# Patient Record
Sex: FEMALE | Race: WHITE | NOT HISPANIC OR LATINO | Employment: FULL TIME | URBAN - METROPOLITAN AREA
[De-identification: names, ages, dates, MRNs, and addresses within clinical notes are randomized per-mention and may not be internally consistent; named-entity substitution may affect disease eponyms.]

---

## 2017-10-12 ENCOUNTER — HOSPITAL ENCOUNTER (OUTPATIENT)
Dept: FAMILY MEDICINE CLINIC | Facility: CLINIC | Age: 37
Setting detail: SPECIMEN
Discharge: HOME | End: 2017-10-12

## 2017-10-12 LAB
ALBUMIN SERPL-MCNC: 3.9 G/DL (ref 3.5–4.8)
ALBUMIN/GLOB SERPL: 1.3 {RATIO} (ref 1–1.7)
ALP SERPL-CCNC: 53 IU/L (ref 32–91)
ALT SERPL-CCNC: 48 IU/L (ref 14–54)
ANION GAP SERPL CALC-SCNC: 11.1 MMOL/L (ref 10–20)
AST SERPL-CCNC: 34 IU/L (ref 15–41)
BASOPHILS # BLD AUTO: 0.1 10*3/UL (ref 0–0.2)
BASOPHILS NFR BLD AUTO: 2 % (ref 0–2)
BILIRUB SERPL-MCNC: 1 MG/DL (ref 0.3–1.2)
BUN SERPL-MCNC: 13 MG/DL (ref 8–20)
BUN/CREAT SERPL: 18.6 (ref 5.4–26.2)
CALCIUM SERPL-MCNC: 9 MG/DL (ref 8.9–10.3)
CHLORIDE SERPL-SCNC: 104 MMOL/L (ref 101–111)
CHOLEST SERPL-MCNC: 163 MG/DL
CHOLEST/HDLC SERPL: 3.5 {RATIO}
CONV CO2: 26 MMOL/L (ref 22–32)
CONV LDL CHOLESTEROL DIRECT: 102 MG/DL (ref 0–100)
CONV TOTAL PROTEIN: 6.8 G/DL (ref 6.1–7.9)
CREAT UR-MCNC: 0.7 MG/DL (ref 0.4–1)
DIFFERENTIAL METHOD BLD: (no result)
EOSINOPHIL # BLD AUTO: 0.5 10*3/UL (ref 0–0.3)
EOSINOPHIL # BLD AUTO: 8 % (ref 0–3)
ERYTHROCYTE [DISTWIDTH] IN BLOOD BY AUTOMATED COUNT: 12.8 % (ref 11.5–14.5)
GLOBULIN UR ELPH-MCNC: 2.9 G/DL (ref 2.5–3.8)
GLUCOSE SERPL-MCNC: 94 MG/DL (ref 65–99)
HCT VFR BLD AUTO: 38.1 % (ref 35–49)
HDLC SERPL-MCNC: 47 MG/DL
HGB BLD-MCNC: 13 G/DL (ref 12–15)
LDLC/HDLC SERPL: 2.2 {RATIO}
LIPID INTERPRETATION: ABNORMAL
LYMPHOCYTES # BLD AUTO: 1.4 10*3/UL (ref 0.8–4.8)
LYMPHOCYTES NFR BLD AUTO: 21 % (ref 18–42)
MCH RBC QN AUTO: 30.4 PG (ref 26–32)
MCHC RBC AUTO-ENTMCNC: 34.1 G/DL (ref 32–36)
MCV RBC AUTO: 89.2 FL (ref 80–94)
MONOCYTES # BLD AUTO: 0.4 10*3/UL (ref 0.1–1.3)
MONOCYTES NFR BLD AUTO: 6 % (ref 2–11)
NEUTROPHILS # BLD AUTO: 4.1 10*3/UL (ref 2.3–8.6)
NEUTROPHILS NFR BLD AUTO: 63 % (ref 50–75)
NRBC BLD AUTO-RTO: 0 /100{WBCS}
NRBC/RBC NFR BLD MANUAL: 0 10*3/UL
PLATELET # BLD AUTO: 298 10*3/UL (ref 150–450)
PMV BLD AUTO: 9.3 FL (ref 7.4–10.4)
POTASSIUM SERPL-SCNC: 4.1 MMOL/L (ref 3.6–5.1)
RBC # BLD AUTO: 4.28 10*6/UL (ref 4–5.4)
SODIUM SERPL-SCNC: 137 MMOL/L (ref 136–144)
TRIGL SERPL-MCNC: 68 MG/DL
TSH SERPL-ACNC: 1.89 UIU/ML (ref 0.34–5.6)
VLDLC SERPL CALC-MCNC: 13.8 MG/DL
WBC # BLD AUTO: 6.5 10*3/UL (ref 4.5–11.5)

## 2020-01-24 ENCOUNTER — APPOINTMENT (OUTPATIENT)
Dept: GENERAL RADIOLOGY | Facility: HOSPITAL | Age: 40
End: 2020-01-24

## 2020-01-24 ENCOUNTER — HOSPITAL ENCOUNTER (EMERGENCY)
Facility: HOSPITAL | Age: 40
Discharge: HOME | End: 2020-01-24
Attending: EMERGENCY MEDICINE | Admitting: EMERGENCY MEDICINE

## 2020-01-24 VITALS
BODY MASS INDEX: 29.38 KG/M2 | TEMPERATURE: 97.8 F | RESPIRATION RATE: 18 BRPM | WEIGHT: 176.37 LBS | HEIGHT: 65 IN | SYSTOLIC BLOOD PRESSURE: 114 MMHG | DIASTOLIC BLOOD PRESSURE: 78 MMHG | HEART RATE: 73 BPM | OXYGEN SATURATION: 100 %

## 2020-01-24 DIAGNOSIS — K21.9 GASTROESOPHAGEAL REFLUX DISEASE, ESOPHAGITIS PRESENCE NOT SPECIFIED: ICD-10-CM

## 2020-01-24 DIAGNOSIS — R07.9 CHEST PAIN, UNSPECIFIED TYPE: Primary | ICD-10-CM

## 2020-01-24 LAB
ANION GAP SERPL CALCULATED.3IONS-SCNC: 12 MMOL/L (ref 5–15)
BASOPHILS # BLD AUTO: 0.1 10*3/MM3 (ref 0–0.2)
BASOPHILS NFR BLD AUTO: 1.1 % (ref 0–1.5)
BUN BLD-MCNC: 17 MG/DL (ref 6–20)
BUN/CREAT SERPL: 26.6 (ref 7–25)
CALCIUM SPEC-SCNC: 9 MG/DL (ref 8.6–10.5)
CHLORIDE SERPL-SCNC: 104 MMOL/L (ref 98–107)
CO2 SERPL-SCNC: 24 MMOL/L (ref 22–29)
CREAT BLD-MCNC: 0.64 MG/DL (ref 0.57–1)
DEPRECATED RDW RBC AUTO: 41.6 FL (ref 37–54)
EOSINOPHIL # BLD AUTO: 0.4 10*3/MM3 (ref 0–0.4)
EOSINOPHIL NFR BLD AUTO: 5.4 % (ref 0.3–6.2)
ERYTHROCYTE [DISTWIDTH] IN BLOOD BY AUTOMATED COUNT: 13.4 % (ref 12.3–15.4)
GFR SERPL CREATININE-BSD FRML MDRD: 104 ML/MIN/1.73
GLUCOSE BLD-MCNC: 101 MG/DL (ref 65–99)
HCT VFR BLD AUTO: 39.7 % (ref 34–46.6)
HGB BLD-MCNC: 13.5 G/DL (ref 12–15.9)
LYMPHOCYTES # BLD AUTO: 1.2 10*3/MM3 (ref 0.7–3.1)
LYMPHOCYTES NFR BLD AUTO: 18.5 % (ref 19.6–45.3)
MCH RBC QN AUTO: 30.5 PG (ref 26.6–33)
MCHC RBC AUTO-ENTMCNC: 34.1 G/DL (ref 31.5–35.7)
MCV RBC AUTO: 89.4 FL (ref 79–97)
MONOCYTES # BLD AUTO: 0.4 10*3/MM3 (ref 0.1–0.9)
MONOCYTES NFR BLD AUTO: 6.1 % (ref 5–12)
NEUTROPHILS # BLD AUTO: 4.6 10*3/MM3 (ref 1.7–7)
NEUTROPHILS NFR BLD AUTO: 68.9 % (ref 42.7–76)
NRBC BLD AUTO-RTO: 0 /100 WBC (ref 0–0.2)
PLATELET # BLD AUTO: 312 10*3/MM3 (ref 140–450)
PMV BLD AUTO: 9.3 FL (ref 6–12)
POTASSIUM BLD-SCNC: 4.2 MMOL/L (ref 3.5–5.2)
RBC # BLD AUTO: 4.44 10*6/MM3 (ref 3.77–5.28)
SODIUM BLD-SCNC: 140 MMOL/L (ref 136–145)
TROPONIN T SERPL-MCNC: <0.01 NG/ML (ref 0–0.03)
WBC NRBC COR # BLD: 6.6 10*3/MM3 (ref 3.4–10.8)

## 2020-01-24 PROCEDURE — 96374 THER/PROPH/DIAG INJ IV PUSH: CPT

## 2020-01-24 PROCEDURE — 85025 COMPLETE CBC W/AUTO DIFF WBC: CPT | Performed by: EMERGENCY MEDICINE

## 2020-01-24 PROCEDURE — 84484 ASSAY OF TROPONIN QUANT: CPT | Performed by: EMERGENCY MEDICINE

## 2020-01-24 PROCEDURE — 71045 X-RAY EXAM CHEST 1 VIEW: CPT

## 2020-01-24 PROCEDURE — 96375 TX/PRO/DX INJ NEW DRUG ADDON: CPT

## 2020-01-24 PROCEDURE — 25010000002 ONDANSETRON PER 1 MG: Performed by: EMERGENCY MEDICINE

## 2020-01-24 PROCEDURE — 93005 ELECTROCARDIOGRAM TRACING: CPT | Performed by: EMERGENCY MEDICINE

## 2020-01-24 PROCEDURE — 99283 EMERGENCY DEPT VISIT LOW MDM: CPT

## 2020-01-24 PROCEDURE — 80048 BASIC METABOLIC PNL TOTAL CA: CPT | Performed by: EMERGENCY MEDICINE

## 2020-01-24 RX ORDER — SODIUM CHLORIDE 0.9 % (FLUSH) 0.9 %
10 SYRINGE (ML) INJECTION AS NEEDED
Status: DISCONTINUED | OUTPATIENT
Start: 2020-01-24 | End: 2020-01-24 | Stop reason: HOSPADM

## 2020-01-24 RX ORDER — PANTOPRAZOLE SODIUM 40 MG/1
40 TABLET, DELAYED RELEASE ORAL DAILY
Qty: 14 TABLET | Refills: 0 | Status: SHIPPED | OUTPATIENT
Start: 2020-01-24

## 2020-01-24 RX ORDER — ONDANSETRON 2 MG/ML
4 INJECTION INTRAMUSCULAR; INTRAVENOUS ONCE
Status: COMPLETED | OUTPATIENT
Start: 2020-01-24 | End: 2020-01-24

## 2020-01-24 RX ADMIN — ONDANSETRON 4 MG: 2 INJECTION INTRAMUSCULAR; INTRAVENOUS at 17:41

## 2020-01-24 RX ADMIN — FAMOTIDINE 20 MG: 10 INJECTION INTRAVENOUS at 17:41

## 2020-01-24 NOTE — ED PROVIDER NOTES
Subjective   Patient is a 39-year-old female who had a 20-minute episode of pressure in her chest and epigastric region.  She states she did vomit one time.  She feels at baseline now without complaint.  The pressure was moderate to severe while it had occurred.  She denied associated shortness of breath cough or fever.          Review of Systems  Negative for headache earache throat cough fever vomit diarrhea dysuria denies weight loss or other associated complaints.  Completely systems was obtained and is otherwise negative.  No past medical history on file.    No Known Allergies    No past surgical history on file.    No family history on file.    Social History     Socioeconomic History   • Marital status:      Spouse name: Not on file   • Number of children: Not on file   • Years of education: Not on file   • Highest education level: Not on file           Objective   Physical Exam  HEENT exam shows TMs to be clear.  Oropharynx comers but sclerae nonicteric.  Neck has no adenopathy JVD or bruits.  Lungs are clear.  Heart has a regular rate rhythm without murmur rub or gallop.  Chest is nontender.  Abdomen is soft nontender.  Patient has normal bowel sounds without renogram.  Back has no CVA tenderness.  Extremity exam is no cyanosis or edema.  Procedures     My EKG interpretation shows normal sinus rhythm with no acute ST change      ED Course      Results for orders placed or performed during the hospital encounter of 01/24/20   Basic Metabolic Panel   Result Value Ref Range    Glucose 101 (H) 65 - 99 mg/dL    BUN 17 6 - 20 mg/dL    Creatinine 0.64 0.57 - 1.00 mg/dL    Sodium 140 136 - 145 mmol/L    Potassium 4.2 3.5 - 5.2 mmol/L    Chloride 104 98 - 107 mmol/L    CO2 24.0 22.0 - 29.0 mmol/L    Calcium 9.0 8.6 - 10.5 mg/dL    eGFR Non African Amer 104 >60 mL/min/1.73    BUN/Creatinine Ratio 26.6 (H) 7.0 - 25.0    Anion Gap 12.0 5.0 - 15.0 mmol/L   Troponin   Result Value Ref Range    Troponin T <0.010  0.000 - 0.030 ng/mL   CBC Auto Differential   Result Value Ref Range    WBC 6.60 3.40 - 10.80 10*3/mm3    RBC 4.44 3.77 - 5.28 10*6/mm3    Hemoglobin 13.5 12.0 - 15.9 g/dL    Hematocrit 39.7 34.0 - 46.6 %    MCV 89.4 79.0 - 97.0 fL    MCH 30.5 26.6 - 33.0 pg    MCHC 34.1 31.5 - 35.7 g/dL    RDW 13.4 12.3 - 15.4 %    RDW-SD 41.6 37.0 - 54.0 fl    MPV 9.3 6.0 - 12.0 fL    Platelets 312 140 - 450 10*3/mm3    Neutrophil % 68.9 42.7 - 76.0 %    Lymphocyte % 18.5 (L) 19.6 - 45.3 %    Monocyte % 6.1 5.0 - 12.0 %    Eosinophil % 5.4 0.3 - 6.2 %    Basophil % 1.1 0.0 - 1.5 %    Neutrophils, Absolute 4.60 1.70 - 7.00 10*3/mm3    Lymphocytes, Absolute 1.20 0.70 - 3.10 10*3/mm3    Monocytes, Absolute 0.40 0.10 - 0.90 10*3/mm3    Eosinophils, Absolute 0.40 0.00 - 0.40 10*3/mm3    Basophils, Absolute 0.10 0.00 - 0.20 10*3/mm3    nRBC 0.0 0.0 - 0.2 /100 WBC     Xr Chest 1 View    Result Date: 1/24/2020   1. No acute disease in the chest  Electronically Signed By-DR. Blair Wall MD On:1/24/2020 5:45 PM This report was finalized on [id number] by DR. Blair Wall MD.                                             MDM  Number of Diagnoses or Management Options  Diagnosis management comments: Patient has benign physical exam.  She has no evidence of acute condition based on EKG and troponin.  Metabolic panel is normal.  There is no evidence infectious process.  Patient was pain-free throughout her ED visit.  She was given Zofran and Pepcid IV.  She will be discharged with a prescription for Protonix.  She will see MD for recheck.    Risk of Complications, Morbidity, and/or Mortality  Presenting problems: high  Diagnostic procedures: high  Management options: high    Patient Progress  Patient progress: stable      Final diagnoses:   Chest pain, unspecified type   Gastroesophageal reflux disease, esophagitis presence not specified            Chandra Hummel MD  01/24/20 2220

## 2020-03-11 ENCOUNTER — OFFICE (AMBULATORY)
Dept: URBAN - METROPOLITAN AREA CLINIC 64 | Facility: CLINIC | Age: 40
End: 2020-03-11

## 2020-03-11 VITALS
SYSTOLIC BLOOD PRESSURE: 123 MMHG | HEIGHT: 66 IN | HEART RATE: 77 BPM | DIASTOLIC BLOOD PRESSURE: 72 MMHG | WEIGHT: 177 LBS

## 2020-03-11 DIAGNOSIS — K21.9 GASTRO-ESOPHAGEAL REFLUX DISEASE WITHOUT ESOPHAGITIS: ICD-10-CM

## 2020-03-11 DIAGNOSIS — R11.0 NAUSEA: ICD-10-CM

## 2020-03-11 DIAGNOSIS — K59.09 OTHER CONSTIPATION: ICD-10-CM

## 2020-03-11 DIAGNOSIS — R13.10 DYSPHAGIA, UNSPECIFIED: ICD-10-CM

## 2020-03-11 DIAGNOSIS — R07.89 OTHER CHEST PAIN: ICD-10-CM

## 2020-03-11 PROCEDURE — 99203 OFFICE O/P NEW LOW 30 MIN: CPT | Performed by: INTERNAL MEDICINE

## 2020-03-11 RX ORDER — PANTOPRAZOLE SODIUM 40 MG/1
TABLET, DELAYED RELEASE ORAL
Qty: 90 | Refills: 4 | Status: ACTIVE

## 2021-03-31 ENCOUNTER — OFFICE (AMBULATORY)
Dept: URBAN - METROPOLITAN AREA CLINIC 64 | Facility: CLINIC | Age: 41
End: 2021-03-31

## 2021-03-31 VITALS
SYSTOLIC BLOOD PRESSURE: 127 MMHG | HEART RATE: 70 BPM | DIASTOLIC BLOOD PRESSURE: 88 MMHG | WEIGHT: 174 LBS | HEIGHT: 66 IN

## 2021-03-31 DIAGNOSIS — R19.7 DIARRHEA, UNSPECIFIED: ICD-10-CM

## 2021-03-31 DIAGNOSIS — R10.84 GENERALIZED ABDOMINAL PAIN: ICD-10-CM

## 2021-03-31 DIAGNOSIS — R19.4 CHANGE IN BOWEL HABIT: ICD-10-CM

## 2021-03-31 DIAGNOSIS — R11.0 NAUSEA: ICD-10-CM

## 2021-03-31 DIAGNOSIS — K21.9 GASTRO-ESOPHAGEAL REFLUX DISEASE WITHOUT ESOPHAGITIS: ICD-10-CM

## 2021-03-31 DIAGNOSIS — R13.10 DYSPHAGIA, UNSPECIFIED: ICD-10-CM

## 2021-03-31 PROCEDURE — 99214 OFFICE O/P EST MOD 30 MIN: CPT | Performed by: NURSE PRACTITIONER

## 2021-03-31 RX ORDER — DICYCLOMINE HYDROCHLORIDE 10 MG/1
CAPSULE ORAL
Qty: 240 | Refills: 11 | Status: COMPLETED
Start: 2021-03-31 | End: 2021-09-16

## 2021-05-14 ENCOUNTER — OFFICE (AMBULATORY)
Dept: URBAN - METROPOLITAN AREA PATHOLOGY 4 | Facility: PATHOLOGY | Age: 41
End: 2021-05-14

## 2021-05-14 ENCOUNTER — ON CAMPUS - OUTPATIENT (AMBULATORY)
Dept: URBAN - METROPOLITAN AREA HOSPITAL 2 | Facility: HOSPITAL | Age: 41
End: 2021-05-14
Payer: COMMERCIAL

## 2021-05-14 VITALS
TEMPERATURE: 97.8 F | HEART RATE: 75 BPM | HEART RATE: 72 BPM | DIASTOLIC BLOOD PRESSURE: 68 MMHG | SYSTOLIC BLOOD PRESSURE: 116 MMHG | SYSTOLIC BLOOD PRESSURE: 110 MMHG | HEIGHT: 66 IN | HEART RATE: 80 BPM | RESPIRATION RATE: 18 BRPM | DIASTOLIC BLOOD PRESSURE: 100 MMHG | HEART RATE: 77 BPM | HEART RATE: 85 BPM | SYSTOLIC BLOOD PRESSURE: 120 MMHG | SYSTOLIC BLOOD PRESSURE: 97 MMHG | SYSTOLIC BLOOD PRESSURE: 108 MMHG | HEART RATE: 91 BPM | HEART RATE: 81 BPM | HEART RATE: 83 BPM | HEART RATE: 74 BPM | DIASTOLIC BLOOD PRESSURE: 92 MMHG | RESPIRATION RATE: 16 BRPM | SYSTOLIC BLOOD PRESSURE: 144 MMHG | DIASTOLIC BLOOD PRESSURE: 88 MMHG | DIASTOLIC BLOOD PRESSURE: 87 MMHG | HEART RATE: 88 BPM | WEIGHT: 174 LBS | DIASTOLIC BLOOD PRESSURE: 66 MMHG | DIASTOLIC BLOOD PRESSURE: 78 MMHG | DIASTOLIC BLOOD PRESSURE: 86 MMHG | SYSTOLIC BLOOD PRESSURE: 129 MMHG | SYSTOLIC BLOOD PRESSURE: 93 MMHG | DIASTOLIC BLOOD PRESSURE: 72 MMHG | HEART RATE: 66 BPM | OXYGEN SATURATION: 99 % | SYSTOLIC BLOOD PRESSURE: 140 MMHG | OXYGEN SATURATION: 100 % | SYSTOLIC BLOOD PRESSURE: 102 MMHG | OXYGEN SATURATION: 98 % | DIASTOLIC BLOOD PRESSURE: 69 MMHG | SYSTOLIC BLOOD PRESSURE: 113 MMHG

## 2021-05-14 DIAGNOSIS — K31.7 POLYP OF STOMACH AND DUODENUM: ICD-10-CM

## 2021-05-14 DIAGNOSIS — K20.0 EOSINOPHILIC ESOPHAGITIS: ICD-10-CM

## 2021-05-14 DIAGNOSIS — D12.0 BENIGN NEOPLASM OF CECUM: ICD-10-CM

## 2021-05-14 DIAGNOSIS — R13.10 DYSPHAGIA, UNSPECIFIED: ICD-10-CM

## 2021-05-14 DIAGNOSIS — K21.9 GASTRO-ESOPHAGEAL REFLUX DISEASE WITHOUT ESOPHAGITIS: ICD-10-CM

## 2021-05-14 DIAGNOSIS — K22.2 ESOPHAGEAL OBSTRUCTION: ICD-10-CM

## 2021-05-14 DIAGNOSIS — R19.7 DIARRHEA, UNSPECIFIED: ICD-10-CM

## 2021-05-14 DIAGNOSIS — R19.4 CHANGE IN BOWEL HABIT: ICD-10-CM

## 2021-05-14 DIAGNOSIS — K44.9 DIAPHRAGMATIC HERNIA WITHOUT OBSTRUCTION OR GANGRENE: ICD-10-CM

## 2021-05-14 PROBLEM — K63.5 POLYP OF COLON: Status: ACTIVE | Noted: 2021-05-14

## 2021-05-14 PROBLEM — K22.8 OTHER SPECIFIED DISEASES OF ESOPHAGUS: Status: ACTIVE | Noted: 2021-05-14

## 2021-05-14 LAB
GI HISTOLOGY: A. UNSPECIFIED: (no result)
GI HISTOLOGY: B. SELECT: (no result)
GI HISTOLOGY: C. UNSPECIFIED: (no result)
GI HISTOLOGY: D. UNSPECIFIED: (no result)
GI HISTOLOGY: E. UNSPECIFIED: (no result)
GI HISTOLOGY: PDF REPORT: (no result)

## 2021-05-14 PROCEDURE — 43450 DILATE ESOPHAGUS 1/MULT PASS: CPT | Performed by: INTERNAL MEDICINE

## 2021-05-14 PROCEDURE — 88305 TISSUE EXAM BY PATHOLOGIST: CPT | Performed by: INTERNAL MEDICINE

## 2021-05-14 PROCEDURE — 45385 COLONOSCOPY W/LESION REMOVAL: CPT | Performed by: INTERNAL MEDICINE

## 2021-05-14 PROCEDURE — 43239 EGD BIOPSY SINGLE/MULTIPLE: CPT | Performed by: INTERNAL MEDICINE

## 2021-06-15 ENCOUNTER — OFFICE (AMBULATORY)
Dept: URBAN - METROPOLITAN AREA CLINIC 64 | Facility: CLINIC | Age: 41
End: 2021-06-15

## 2021-06-15 VITALS
HEIGHT: 66 IN | SYSTOLIC BLOOD PRESSURE: 125 MMHG | WEIGHT: 171 LBS | HEART RATE: 68 BPM | DIASTOLIC BLOOD PRESSURE: 82 MMHG

## 2021-06-15 DIAGNOSIS — K21.9 GASTRO-ESOPHAGEAL REFLUX DISEASE WITHOUT ESOPHAGITIS: ICD-10-CM

## 2021-06-15 DIAGNOSIS — R10.84 GENERALIZED ABDOMINAL PAIN: ICD-10-CM

## 2021-06-15 DIAGNOSIS — Z86.010 PERSONAL HISTORY OF COLONIC POLYPS: ICD-10-CM

## 2021-06-15 DIAGNOSIS — R19.7 DIARRHEA, UNSPECIFIED: ICD-10-CM

## 2021-06-15 PROCEDURE — 99213 OFFICE O/P EST LOW 20 MIN: CPT | Performed by: NURSE PRACTITIONER

## 2021-06-15 RX ORDER — BUDESONIDE 1 MG/2ML
2 SUSPENSION RESPIRATORY (INHALATION)
Qty: 120 | Refills: 2 | Status: COMPLETED
Start: 2021-06-15 | End: 2021-09-16

## 2021-09-16 ENCOUNTER — OFFICE (AMBULATORY)
Dept: URBAN - METROPOLITAN AREA CLINIC 64 | Facility: CLINIC | Age: 41
End: 2021-09-16

## 2021-09-16 VITALS
HEIGHT: 66 IN | WEIGHT: 171 LBS | SYSTOLIC BLOOD PRESSURE: 114 MMHG | DIASTOLIC BLOOD PRESSURE: 78 MMHG | HEART RATE: 82 BPM

## 2021-09-16 DIAGNOSIS — R11 NAUSEA AND VOMITING: ICD-10-CM

## 2021-09-16 DIAGNOSIS — R19.7 DIARRHEA, UNSPECIFIED: ICD-10-CM

## 2021-09-16 DIAGNOSIS — R13.10 DYSPHAGIA, UNSPECIFIED: ICD-10-CM

## 2021-09-16 PROCEDURE — 99213 OFFICE O/P EST LOW 20 MIN: CPT | Performed by: NURSE PRACTITIONER

## 2022-01-27 ENCOUNTER — OFFICE (AMBULATORY)
Dept: URBAN - METROPOLITAN AREA CLINIC 64 | Facility: CLINIC | Age: 42
End: 2022-01-27

## 2022-01-27 VITALS
WEIGHT: 183 LBS | DIASTOLIC BLOOD PRESSURE: 78 MMHG | HEART RATE: 77 BPM | HEIGHT: 66 IN | SYSTOLIC BLOOD PRESSURE: 115 MMHG

## 2022-01-27 DIAGNOSIS — R13.10 DYSPHAGIA, UNSPECIFIED: ICD-10-CM

## 2022-01-27 DIAGNOSIS — R19.7 DIARRHEA, UNSPECIFIED: ICD-10-CM

## 2022-01-27 PROCEDURE — 99213 OFFICE O/P EST LOW 20 MIN: CPT | Performed by: NURSE PRACTITIONER

## 2022-08-08 ENCOUNTER — OFFICE (AMBULATORY)
Dept: URBAN - METROPOLITAN AREA CLINIC 64 | Facility: CLINIC | Age: 42
End: 2022-08-08

## 2022-08-08 VITALS
HEART RATE: 74 BPM | WEIGHT: 179 LBS | SYSTOLIC BLOOD PRESSURE: 115 MMHG | DIASTOLIC BLOOD PRESSURE: 70 MMHG | HEIGHT: 66 IN

## 2022-08-08 DIAGNOSIS — R11.0 NAUSEA: ICD-10-CM

## 2022-08-08 DIAGNOSIS — K58.9 IRRITABLE BOWEL SYNDROME WITHOUT DIARRHEA: ICD-10-CM

## 2022-08-08 DIAGNOSIS — R19.4 CHANGE IN BOWEL HABIT: ICD-10-CM

## 2022-08-08 DIAGNOSIS — K22.2 ESOPHAGEAL OBSTRUCTION: ICD-10-CM

## 2022-08-08 PROCEDURE — 99213 OFFICE O/P EST LOW 20 MIN: CPT | Performed by: NURSE PRACTITIONER

## 2022-08-08 RX ORDER — NORTRIPTYLINE HYDROCHLORIDE 10 MG/1
10 CAPSULE ORAL
Qty: 30 | Refills: 11 | Status: COMPLETED
Start: 2022-08-08 | End: 2022-11-07

## 2022-11-07 ENCOUNTER — OFFICE (AMBULATORY)
Dept: URBAN - METROPOLITAN AREA CLINIC 64 | Facility: CLINIC | Age: 42
End: 2022-11-07

## 2022-11-07 VITALS — SYSTOLIC BLOOD PRESSURE: 131 MMHG | HEART RATE: 65 BPM | DIASTOLIC BLOOD PRESSURE: 90 MMHG | HEIGHT: 66 IN

## 2022-11-07 DIAGNOSIS — R19.4 CHANGE IN BOWEL HABIT: ICD-10-CM

## 2022-11-07 PROCEDURE — 99214 OFFICE O/P EST MOD 30 MIN: CPT | Performed by: NURSE PRACTITIONER

## 2022-11-07 RX ORDER — PROMETHAZINE HYDROCHLORIDE 12.5 MG/1
37.5 TABLET ORAL
Qty: 60 | Refills: 3 | Status: COMPLETED
Start: 2022-11-07 | End: 2023-11-29

## 2022-11-07 RX ORDER — FLUTICASONE PROPIONATE 110 UG/1
AEROSOL, METERED RESPIRATORY (INHALATION)
Qty: 1 | Refills: 5 | Status: COMPLETED
Start: 2022-11-07 | End: 2023-09-18

## 2022-11-07 RX ORDER — DICYCLOMINE HYDROCHLORIDE 10 MG/1
CAPSULE ORAL
Qty: 90 | Refills: 7 | Status: COMPLETED
End: 2024-03-25

## 2022-11-07 RX ORDER — ONDANSETRON 4 MG/1
TABLET ORAL
Qty: 21 | Refills: 4 | Status: ACTIVE

## 2023-03-06 ENCOUNTER — OFFICE (AMBULATORY)
Dept: URBAN - METROPOLITAN AREA CLINIC 64 | Facility: CLINIC | Age: 43
End: 2023-03-06

## 2023-03-06 VITALS
HEART RATE: 68 BPM | WEIGHT: 179 LBS | SYSTOLIC BLOOD PRESSURE: 131 MMHG | HEIGHT: 66 IN | DIASTOLIC BLOOD PRESSURE: 81 MMHG

## 2023-03-06 DIAGNOSIS — K21.00 GASTRO-ESOPHAGEAL REFLUX DISEASE WITH ESOPHAGITIS, WITHOUT B: ICD-10-CM

## 2023-03-06 PROCEDURE — 99212 OFFICE O/P EST SF 10 MIN: CPT | Performed by: NURSE PRACTITIONER

## 2023-09-18 ENCOUNTER — OFFICE (AMBULATORY)
Dept: URBAN - METROPOLITAN AREA CLINIC 64 | Facility: CLINIC | Age: 43
End: 2023-09-18

## 2023-09-18 VITALS
HEART RATE: 56 BPM | WEIGHT: 169 LBS | DIASTOLIC BLOOD PRESSURE: 80 MMHG | SYSTOLIC BLOOD PRESSURE: 124 MMHG | HEIGHT: 66 IN

## 2023-09-18 DIAGNOSIS — R11.2 NAUSEA WITH VOMITING, UNSPECIFIED: ICD-10-CM

## 2023-09-18 DIAGNOSIS — K92.1 MELENA: ICD-10-CM

## 2023-09-18 DIAGNOSIS — R10.84 GENERALIZED ABDOMINAL PAIN: ICD-10-CM

## 2023-09-18 DIAGNOSIS — R19.7 DIARRHEA, UNSPECIFIED: ICD-10-CM

## 2023-09-18 DIAGNOSIS — K58.9 IRRITABLE BOWEL SYNDROME WITHOUT DIARRHEA: ICD-10-CM

## 2023-09-18 PROCEDURE — 99213 OFFICE O/P EST LOW 20 MIN: CPT | Performed by: NURSE PRACTITIONER

## 2023-11-29 ENCOUNTER — OFFICE (AMBULATORY)
Dept: URBAN - METROPOLITAN AREA CLINIC 64 | Facility: CLINIC | Age: 43
End: 2023-11-29

## 2023-11-29 VITALS
SYSTOLIC BLOOD PRESSURE: 142 MMHG | WEIGHT: 168 LBS | HEART RATE: 77 BPM | DIASTOLIC BLOOD PRESSURE: 89 MMHG | HEIGHT: 66 IN

## 2023-11-29 DIAGNOSIS — R19.7 DIARRHEA, UNSPECIFIED: ICD-10-CM

## 2023-11-29 DIAGNOSIS — K80.20 CALCULUS OF GALLBLADDER WITHOUT CHOLECYSTITIS WITHOUT OBSTRU: ICD-10-CM

## 2023-11-29 DIAGNOSIS — R11.0 NAUSEA: ICD-10-CM

## 2023-11-29 DIAGNOSIS — R10.84 GENERALIZED ABDOMINAL PAIN: ICD-10-CM

## 2023-11-29 PROCEDURE — 99213 OFFICE O/P EST LOW 20 MIN: CPT | Performed by: NURSE PRACTITIONER

## 2023-12-12 ENCOUNTER — OFFICE VISIT (OUTPATIENT)
Dept: SURGERY | Facility: CLINIC | Age: 43
End: 2023-12-12
Payer: COMMERCIAL

## 2023-12-12 VITALS
BODY MASS INDEX: 27.74 KG/M2 | DIASTOLIC BLOOD PRESSURE: 94 MMHG | HEART RATE: 75 BPM | OXYGEN SATURATION: 98 % | WEIGHT: 172.6 LBS | TEMPERATURE: 98 F | SYSTOLIC BLOOD PRESSURE: 139 MMHG | HEIGHT: 66 IN | RESPIRATION RATE: 18 BRPM

## 2023-12-12 DIAGNOSIS — K80.20 CALCULUS OF GALLBLADDER WITHOUT CHOLECYSTITIS WITHOUT OBSTRUCTION: Primary | ICD-10-CM

## 2023-12-12 PROCEDURE — 99204 OFFICE O/P NEW MOD 45 MIN: CPT | Performed by: SURGERY

## 2023-12-12 RX ORDER — PROMETHAZINE HYDROCHLORIDE 12.5 MG/1
1 TABLET ORAL 3 TIMES DAILY PRN
COMMUNITY

## 2023-12-12 RX ORDER — ONDANSETRON 4 MG/1
1 TABLET, FILM COATED ORAL 3 TIMES DAILY PRN
COMMUNITY

## 2023-12-12 RX ORDER — MULTIPLE VITAMINS W/ MINERALS TAB 9MG-400MCG
1 TAB ORAL DAILY
COMMUNITY

## 2023-12-12 RX ORDER — VALACYCLOVIR HYDROCHLORIDE 1 G/1
TABLET, FILM COATED ORAL
COMMUNITY

## 2023-12-12 RX ORDER — DICYCLOMINE HYDROCHLORIDE 10 MG/1
1 CAPSULE ORAL 3 TIMES DAILY PRN
COMMUNITY

## 2023-12-12 NOTE — PROGRESS NOTES
"Subjective   Linda Mayberry is a 43 y.o. female.   Chief Complaint   Patient presents with    Cholelithiasis     New pt ref Amalia Elena NP, Gallstones      /94 (BP Location: Left arm, Patient Position: Sitting, Cuff Size: Adult)   Pulse 75   Temp 98 °F (36.7 °C) (Infrared)   Resp 18   Ht 167.6 cm (66\")   Wt 78.3 kg (172 lb 9.6 oz)   SpO2 98%   BMI 27.86 kg/m²     HISTORY OF PRESENT ILLNESS:  43-year-old lady referred over to me from gastroenterology for evaluation of gallstones.  She says that she has had several years of abdominal pain.  She developed an intense pressure in her abdomen she gets nauseated sometimes vomits developed cold sweats and then has severe bouts of diarrhea.  As part of her workup she has been working with gastroenterology of tried some medical management she is also had some dietary changes and not had any definitive relief of symptoms.  She underwent a CT scan demonstrating right and left ovarian cysts and gallstones or gallbladder sludge but there is no evidence of cholecystitis or Carmen ductal lithiasis.  When asking her where her pain is mostly in the pelvis and the right suprapubic region.  It is alleviated with the bowel function.      Outpatient Encounter Medications as of 12/12/2023   Medication Sig Dispense Refill    dicyclomine (BENTYL) 10 MG capsule Take 1 capsule by mouth 3 (Three) Times a Day As Needed.      multivitamin with minerals tablet tablet Take 1 tablet by mouth Daily.      ondansetron (ZOFRAN) 4 MG tablet Take 1 tablet by mouth 3 (Three) Times a Day As Needed.      pantoprazole (PROTONIX) 40 MG EC tablet Take 1 tablet by mouth Daily. 14 tablet 0    promethazine (PHENERGAN) 12.5 MG tablet Take 1 tablet by mouth 3 (Three) Times a Day As Needed.      valACYclovir (VALTREX) 1000 MG tablet TAKE 2 TABLETS BY MOUTH TWICE DAILY FOR 1 DAY FOR FLARES       No facility-administered encounter medications on file as of 12/12/2023.     Past Medical History: "   Diagnosis Date    Cholelithiasis 11/29/23    Colon polyp 05/2021     Past Surgical History:   Procedure Laterality Date    BREAST BIOPSY  2008    Left    BREAST SURGERY  2008     Family History   Problem Relation Age of Onset    Arthritis Mother     Mitral valve prolapse Mother     Alcohol abuse Father     Hypertension Father        Social History     Tobacco Use    Smoking status: Never     Passive exposure: Never    Smokeless tobacco: Never   Vaping Use    Vaping Use: Never used   Substance Use Topics    Alcohol use: Yes    Drug use: Never     Patient has no known allergies.    Review of Systems  Complete review of systems has been obtained is positive for abdominal pain nausea and vomiting the remainder of the review of systems is negative  Objective     Physical Exam  Constitutional:       Appearance: Normal appearance.   HENT:      Head: Normocephalic and atraumatic.   Cardiovascular:      Rate and Rhythm: Normal rate.   Pulmonary:      Effort: Pulmonary effort is normal. No respiratory distress.   Abdominal:      General: There is no distension.      Palpations: Abdomen is soft.      Tenderness: There is no abdominal tenderness.   Skin:     General: Skin is warm and dry.   Neurological:      General: No focal deficit present.      Mental Status: She is alert. Mental status is at baseline.   Psychiatric:         Mood and Affect: Mood normal.         Behavior: Behavior normal.           Assessment & Plan   Diagnoses and all orders for this visit:    1. Calculus of gallbladder without cholecystitis without obstruction (Primary)    I counseled her about the natural history of gallstones.  Her symptoms did not have a classic story for biliary colic.  Talked her about ongoing nonoperative management in conjunction with gastroenterology to potentially consider some alternative therapies like bile sequestration or ursodiol to include some gallbladder pathology.  Also talked to her about potential surgical plan.  She  does have a history of a  and tubal ligation so some of her abdominal pain could be related to lesions in the pelvis.  Offered diagnostic laparoscopy adhesiolysis and/or cholecystectomy.  We talked about the steps of these operations anticipated cover the possible complications.  If she does have adhesions certainly talked about the risk of injury to the bowel and the possibility for a laparotomy for adhesiolysis or repair of an injury.  After our discussion about these options that she does not want to pursue surgical therapy and would like to continue the medical management.  Certainly recommended that she could consider talking to gynecology as well to see if the ovaries could be contributing or if they would be willing to offer her diagnostic laparoscopy of the pelvis for this pelvic pain.  She has my card and knows where I met and is can call me down the road if she wants to pursue any surgical therapy directed at the gallbladder in the future    Therese Sifuentes reviewed including medicine's notes CMP showing normal LFTs the CT read and counseled about a major abdominal surgery without significant risk factors for morbidity    Josué Delgado MD  2023  9:31 AM EST    This note was created using Dragon Voice Recognition software.

## 2024-03-25 ENCOUNTER — OFFICE (AMBULATORY)
Dept: URBAN - METROPOLITAN AREA CLINIC 64 | Facility: CLINIC | Age: 44
End: 2024-03-25

## 2024-03-25 VITALS
SYSTOLIC BLOOD PRESSURE: 126 MMHG | HEIGHT: 66 IN | DIASTOLIC BLOOD PRESSURE: 78 MMHG | HEART RATE: 49 BPM | WEIGHT: 174 LBS

## 2024-03-25 DIAGNOSIS — R10.84 GENERALIZED ABDOMINAL PAIN: ICD-10-CM

## 2024-03-25 DIAGNOSIS — R11.2 NAUSEA WITH VOMITING, UNSPECIFIED: ICD-10-CM

## 2024-03-25 DIAGNOSIS — K58.1 IRRITABLE BOWEL SYNDROME WITH CONSTIPATION: ICD-10-CM

## 2024-03-25 PROCEDURE — 99214 OFFICE O/P EST MOD 30 MIN: CPT | Performed by: NURSE PRACTITIONER

## 2024-03-25 RX ORDER — HYOSCYAMINE SULFATE 0.12 MG/1
0.5 TABLET ORAL
Qty: 360 | Refills: 3 | Status: ACTIVE
Start: 2024-03-25

## 2024-03-25 RX ORDER — DICYCLOMINE HYDROCHLORIDE 10 MG/1
CAPSULE ORAL
Qty: 90 | Refills: 7 | Status: COMPLETED
End: 2024-03-25